# Patient Record
Sex: MALE | Race: WHITE | Employment: FULL TIME | URBAN - METROPOLITAN AREA
[De-identification: names, ages, dates, MRNs, and addresses within clinical notes are randomized per-mention and may not be internally consistent; named-entity substitution may affect disease eponyms.]

---

## 2019-10-29 ENCOUNTER — OFFICE VISIT (OUTPATIENT)
Dept: SLEEP MEDICINE | Facility: CLINIC | Age: 59
End: 2019-10-29
Payer: COMMERCIAL

## 2019-10-29 VITALS
OXYGEN SATURATION: 97 % | DIASTOLIC BLOOD PRESSURE: 86 MMHG | WEIGHT: 206 LBS | HEIGHT: 69 IN | SYSTOLIC BLOOD PRESSURE: 130 MMHG | BODY MASS INDEX: 30.51 KG/M2 | HEART RATE: 86 BPM

## 2019-10-29 DIAGNOSIS — F51.04 PSYCHOPHYSIOLOGICAL INSOMNIA: ICD-10-CM

## 2019-10-29 DIAGNOSIS — E66.09 CLASS 1 OBESITY DUE TO EXCESS CALORIES WITH SERIOUS COMORBIDITY AND BODY MASS INDEX (BMI) OF 30.0 TO 30.9 IN ADULT: ICD-10-CM

## 2019-10-29 DIAGNOSIS — E66.811 CLASS 1 OBESITY DUE TO EXCESS CALORIES WITH SERIOUS COMORBIDITY AND BODY MASS INDEX (BMI) OF 30.0 TO 30.9 IN ADULT: ICD-10-CM

## 2019-10-29 DIAGNOSIS — G47.33 OSA (OBSTRUCTIVE SLEEP APNEA): Primary | ICD-10-CM

## 2019-10-29 PROBLEM — N18.30 STAGE 3 CHRONIC KIDNEY DISEASE (H): Status: ACTIVE | Noted: 2017-11-07

## 2019-10-29 PROBLEM — I10 ESSENTIAL HYPERTENSION: Status: ACTIVE | Noted: 2019-05-03

## 2019-10-29 PROBLEM — Z79.4 CONTROLLED TYPE 2 DIABETES MELLITUS WITH STAGE 3 CHRONIC KIDNEY DISEASE, WITH LONG-TERM CURRENT USE OF INSULIN (H): Status: ACTIVE | Noted: 2018-01-22

## 2019-10-29 PROBLEM — E11.22 CONTROLLED TYPE 2 DIABETES MELLITUS WITH STAGE 3 CHRONIC KIDNEY DISEASE, WITH LONG-TERM CURRENT USE OF INSULIN (H): Status: ACTIVE | Noted: 2018-01-22

## 2019-10-29 PROBLEM — N18.30 CONTROLLED TYPE 2 DIABETES MELLITUS WITH STAGE 3 CHRONIC KIDNEY DISEASE, WITH LONG-TERM CURRENT USE OF INSULIN (H): Status: ACTIVE | Noted: 2018-01-22

## 2019-10-29 PROCEDURE — 99204 OFFICE O/P NEW MOD 45 MIN: CPT | Performed by: INTERNAL MEDICINE

## 2019-10-29 RX ORDER — ASPIRIN 81 MG/1
81 TABLET, CHEWABLE ORAL
COMMUNITY
Start: 2017-10-20

## 2019-10-29 RX ORDER — CITALOPRAM HYDROBROMIDE 20 MG/1
20 TABLET ORAL EVERY EVENING
COMMUNITY
Start: 2017-11-07

## 2019-10-29 RX ORDER — MULTIVIT-MIN/IRON/FOLIC ACID/K 18-600-40
1 CAPSULE ORAL EVERY MORNING
COMMUNITY
Start: 2019-01-29

## 2019-10-29 RX ORDER — CETIRIZINE HYDROCHLORIDE 10 MG/1
10 TABLET ORAL
COMMUNITY

## 2019-10-29 RX ORDER — INSULIN GLARGINE 100 [IU]/ML
12 INJECTION, SOLUTION SUBCUTANEOUS
COMMUNITY
End: 2019-11-22

## 2019-10-29 RX ORDER — INSULIN GLARGINE 100 [IU]/ML
14 INJECTION, SOLUTION SUBCUTANEOUS
COMMUNITY
Start: 2017-03-21

## 2019-10-29 RX ORDER — LOSARTAN POTASSIUM AND HYDROCHLOROTHIAZIDE 12.5; 1 MG/1; MG/1
1 TABLET ORAL EVERY EVENING
COMMUNITY
Start: 2019-03-07

## 2019-10-29 RX ORDER — ATORVASTATIN CALCIUM 20 MG/1
20 TABLET, FILM COATED ORAL AT BEDTIME
COMMUNITY

## 2019-10-29 RX ORDER — TRAZODONE HYDROCHLORIDE 50 MG/1
100 TABLET, FILM COATED ORAL AT BEDTIME
COMMUNITY
Start: 2019-04-22

## 2019-10-29 ASSESSMENT — MIFFLIN-ST. JEOR: SCORE: 1739.79

## 2019-10-29 NOTE — NURSING NOTE
"Chief Complaint   Patient presents with     Sleep Problem     consult- not sleeping very well       Initial /86   Pulse 86   Ht 1.753 m (5' 9\")   Wt 93.4 kg (206 lb)   SpO2 97%   BMI 30.42 kg/m   Estimated body mass index is 30.42 kg/m  as calculated from the following:    Height as of this encounter: 1.753 m (5' 9\").    Weight as of this encounter: 93.4 kg (206 lb).    Medication Reconciliation: complete    Neck circumference: 16 inches / 41 centimeters.      "

## 2019-10-29 NOTE — LETTER
10/29/2019         RE: Daniel Lorenzo  [address]  [facility] [address]        Dear Colleague,    Thank you for referring your patient, Daniel Lorenzo, to the [facility]. Please see a copy of my visit note below.      Sleep Consultation:    Date on this visit: 10/29/2019    Daniel Lorenzo is a 59 year old male presenting with complaints of trouble falling and staying asleep, waking up frequently.    He has a history of HTN, DM2, CKD, Obstructive Sleep Apnea, and obesity.  Reports he was diagnosed with Obstructive Sleep Apnea 20 years ago and couldn't tolerate CPAP with FFM at the time. Has no memory of severity or settings.      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  Getting to bed around 11 PM and takes a few hours to fall asleep.   [place] a [place] more than a year.  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  Gets home around 11:30 PM and 12:15 AM in bed after taking Trazodone.  Takes 1 - 2 hours to fall asleep.  Wakes up around 2 hours later and will check the clock, walk around, and get a drink of water.     Reports Trazodone doesn't kick in for a while so sometimes he takes it hours early but without improvement.    Sleep Disordered Breathing - Snores loudly.  Has had witnessed apneas and snort arousals.   Has nocturia 1 - 2 times per night.  Does not have nocturnal GERD.   Upon waking feels tired/lethargic.     Patient was counseled on the importance of driving while alert, to pull over if drowsy, or nap before getting into the vehicle if sleepy.    Movement/Behaviors - No somniloquy.  No somnambulism.  No sleep related eating.  No dream enactment behavior.   Patient reports rare typical restless legs syndrome symptoms or leg soreness    Alcohol use - Rare  Caffeine intake - Occ coffee in the morning.  Tobacco exposure - None    Lives with wife  (she works in [place] and only is at home on the weekends) and daughter who is just post college.  Has 1 pet, dog.     No known family history of snoring or Obstructive Sleep Apnea.    Allergies:    No Known Allergies    Medications:    Current Outpatient Medications   Medication Sig Dispense Refill     aspirin (ASA) 81 MG chewable tablet Take 81 mg by mouth       atorvastatin (LIPITOR) 20 MG tablet Take 20 mg by mouth       cetirizine (ZYRTEC ALLERGY) 10 MG tablet Take 10 mg by mouth       Cholecalciferol (VITAMIN D) 50 MCG (2000 UT) CAPS Take 1 capsule by mouth       citalopram (CELEXA) 20 MG tablet Take 20 mg by mouth       insulin glargine (BASAGLAR KWIKPEN) 100 UNIT/ML pen Inject 14 Units Subcutaneous       insulin glargine (LANTUS VIAL) 100 UNIT/ML vial Inject 12 Units Subcutaneous       losartan-hydrochlorothiazide (HYZAAR) 100-12.5 MG tablet TAKE 1 TABLET BY MOUTH DAILY       metFORMIN (GLUCOPHAGE) 1000 MG tablet TAKE 1 TABLET BY MOUTH TWICE DAILY       traZODone (DESYREL) 50 MG tablet Take  mg by mouth         Problem List:  Patient Active Problem List    Diagnosis Date Noted     Pain in joint, shoulder region 10/15/2014     Priority: Medium        Past Medical/Surgical History:  No past medical history on file.  No past surgical history on file.    Social History:  Social History     Socioeconomic History     Marital status:      Spouse name: Not on file     Number of children: Not on file     Years of education: Not on file     Highest education level: Not on file   Occupational History     Not on file   Social Needs     Financial resource strain: Not on file     Food insecurity:     Worry: Not on file     Inability: Not on file     Transportation needs:     Medical: Not on file     Non-medical: Not on file   Tobacco Use     Smoking status: Never Smoker     Smokeless tobacco: Never Used   Substance and Sexual Activity     Alcohol use: Not Currently     Drug use: Never     Sexual activity: Not  "on file   Lifestyle     Physical activity:     Days per week: Not on file     Minutes per session: Not on file     Stress: Not on file   Relationships     Social connections:     Talks on phone: Not on file     Gets together: Not on file     Attends Hindu service: Not on file     Active member of club or organization: Not on file     Attends meetings of clubs or organizations: Not on file     Relationship status: Not on file     Intimate partner violence:     Fear of current or ex partner: Not on file     Emotionally abused: Not on file     Physically abused: Not on file     Forced sexual activity: Not on file   Other Topics Concern     Not on file   Social History Narrative     Not on file     Family History:  No family history on file.    Review of Systems:  A complete review of systems reviewed by me is negative with the exeption of what has been mentioned in the history of present illness.  Headaches; weakness in arms or legs  Diarrhea; constipation; blood in stools; abdominal pain  Dysuria; hematuria; urinary frequency  Muscle pain; joint or bone pain  Increased thirst or urination; diabetes    Physical Examination:  Vitals: /86   Pulse 86   Ht 1.753 m (5' 9\")   Wt 93.4 kg (206 lb)   SpO2 97%   BMI 30.42 kg/m     BMI= Body mass index is 30.42 kg/m .    Neck Cir (cm): 41 cm    [place] Total Score 10/29/2019   Total score - [place] 12     GERI Total Score: 22 (10/29/19 1100)    General appearance: No apparent distress, well groomed.    HEENT:   Head: Normocephalic, atraumatic.  Eyes: PERRL  Nose: Nares patent.  No exudate.  No septal deviation noted.  Mouth: Teeth: Fair to good, multiple caries.   Tongue: Normal  Oropharynx:  Mallampati Classification: II    Tonsils: Grade 0    Uvula: Normal    Neck: Supple without bruit.     Neck Cir (cm): 41 cm (10/29/2019 11:22 AM)      Cardiac: Regular rate and rhythm.  No murmurs.  Radial pulses are strong and symmetric.  Pulmonary: Symmetric air movement, " lungs clear to auscultation bilaterally.  Musculoskeletal: No edema noted.  No clubbing or cyanosis.  Skin: Warm, dry, intact.  Neurologic: Alert and oriented to person, place and time   Gait is normal.  Psychiatric: Affect pleasant.  Mood good.     Impression/Plan:  1. ELKIN (obstructive sleep apnea)  Patient carries diagnosis of Obstructive Sleep Apnea but we have no idea when or where he was diagnosed, nor do we have anything about magnitude of severity.  Given severe insomnia and sleep fragmentation with daytime symptoms, I am in favor of re-evaluating sleep apnea with a split night sleep study.      We discussed pathophysiology of obstructive sleep apnea, testing with overnight polysomnography, and treatment modalities (CPAP only discussed at this visit). We discussed consequences of untreated Obstructive Sleep Apnea, such as markedly elevated risk for heart attack or stroke. Patient is interested in treatment and willing to undergo overnight sleep testing.    Home sleep testing is inappropriate for this patient due to severe and persistent insomnia.  Study has been ordered today.      In case of insomnia during the study: patient has been instructed to bring home medications.  - Comprehensive Sleep Study; Future    2. Class 1 obesity due to excess calories with serious comorbidity and body mass index (BMI) of 30.0 to 30.9 in adult  We discussed the link between obesity, sleep apnea, and health outcomes.  Patient was encouraged to decrease caloric intake and increase activity levels to try to move towards a normal weight.  He was encouraged to discuss further strategies with his primary care provider.     3. Psychophysiological insomnia  Severe insomnia in the context of Obstructive Sleep Apnea noted above. Will start with Obstructive Sleep Apnea re-evaluation and management first.    Literature provided regarding sleep apnea.      He will follow up with me in approximately two weeks after his sleep study has  been competed to review the results and discuss plan of care.       Polysomnography reviewed.  Obstructive sleep apnea reviewed.  Complications of untreated sleep apnea were reviewed.    Eduard Mcclendon MD, MD, Sleep Physician  Oct 29, 2019     CC: No ref. provider found          Again, thank you for allowing me to participate in the care of your patient.        Sincerely,        Eduard Mcclendon MD

## 2019-10-29 NOTE — PROGRESS NOTES
Sleep Consultation:    Date on this visit: 10/29/2019    Daniel Lorenzo is a 59 year old male presenting with complaints of trouble falling and staying asleep, waking up frequently.    He has a history of HTN, DM2, CKD, Obstructive Sleep Apnea, and obesity.  Reports he was diagnosed with Obstructive Sleep Apnea 20 years ago and couldn't tolerate CPAP with FFM at the time. Has no memory of severity or settings.      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  Getting to bed around 11 PM and takes a few hours to fall asleep.   [place] a [place] more than a year.  Works Sa 12:30 PM - 11 PM, Bhardwaj 2:30 PM - 11 PM, M 2:30 PM - 11 PM at [employer].  Gets home around 11:30 PM and 12:15 AM in bed after taking Trazodone.  Takes 1 - 2 hours to fall asleep.  Wakes up around 2 hours later and will check the clock, walk around, and get a drink of water.     Reports Trazodone doesn't kick in for a while so sometimes he takes it hours early but without improvement.    Sleep Disordered Breathing - Snores loudly.  Has had witnessed apneas and snort arousals.   Has nocturia 1 - 2 times per night.  Does not have nocturnal GERD.   Upon waking feels tired/lethargic.     Patient was counseled on the importance of driving while alert, to pull over if drowsy, or nap before getting into the vehicle if sleepy.    Movement/Behaviors - No somniloquy.  No somnambulism.  No sleep related eating.  No dream enactment behavior.   Patient reports rare typical restless legs syndrome symptoms or leg soreness    Alcohol use - Rare  Caffeine intake - Occ coffee in the morning.  Tobacco exposure - None    Lives with wife (she works in [place] and only is at home on the weekends) and daughter who is just post college.  Has 1 pet, dog.     No known family history of snoring or Obstructive Sleep Apnea.    Allergies:    No Known Allergies    Medications:    Current  Outpatient Medications   Medication Sig Dispense Refill     aspirin (ASA) 81 MG chewable tablet Take 81 mg by mouth       atorvastatin (LIPITOR) 20 MG tablet Take 20 mg by mouth       cetirizine (ZYRTEC ALLERGY) 10 MG tablet Take 10 mg by mouth       Cholecalciferol (VITAMIN D) 50 MCG (2000 UT) CAPS Take 1 capsule by mouth       citalopram (CELEXA) 20 MG tablet Take 20 mg by mouth       insulin glargine (BASAGLAR KWIKPEN) 100 UNIT/ML pen Inject 14 Units Subcutaneous       insulin glargine (LANTUS VIAL) 100 UNIT/ML vial Inject 12 Units Subcutaneous       losartan-hydrochlorothiazide (HYZAAR) 100-12.5 MG tablet TAKE 1 TABLET BY MOUTH DAILY       metFORMIN (GLUCOPHAGE) 1000 MG tablet TAKE 1 TABLET BY MOUTH TWICE DAILY       traZODone (DESYREL) 50 MG tablet Take  mg by mouth         Problem List:  Patient Active Problem List    Diagnosis Date Noted     Pain in joint, shoulder region 10/15/2014     Priority: Medium        Past Medical/Surgical History:  No past medical history on file.  No past surgical history on file.    Social History:  Social History     Socioeconomic History     Marital status:      Spouse name: Not on file     Number of children: Not on file     Years of education: Not on file     Highest education level: Not on file   Occupational History     Not on file   Social Needs     Financial resource strain: Not on file     Food insecurity:     Worry: Not on file     Inability: Not on file     Transportation needs:     Medical: Not on file     Non-medical: Not on file   Tobacco Use     Smoking status: Never Smoker     Smokeless tobacco: Never Used   Substance and Sexual Activity     Alcohol use: Not Currently     Drug use: Never     Sexual activity: Not on file   Lifestyle     Physical activity:     Days per week: Not on file     Minutes per session: Not on file     Stress: Not on file   Relationships     Social connections:     Talks on phone: Not on file     Gets together: Not on file      "Attends Adventist service: Not on file     Active member of club or organization: Not on file     Attends meetings of clubs or organizations: Not on file     Relationship status: Not on file     Intimate partner violence:     Fear of current or ex partner: Not on file     Emotionally abused: Not on file     Physically abused: Not on file     Forced sexual activity: Not on file   Other Topics Concern     Not on file   Social History Narrative     Not on file     Family History:  No family history on file.    Review of Systems:  A complete review of systems reviewed by me is negative with the exeption of what has been mentioned in the history of present illness.  Headaches; weakness in arms or legs  Diarrhea; constipation; blood in stools; abdominal pain  Dysuria; hematuria; urinary frequency  Muscle pain; joint or bone pain  Increased thirst or urination; diabetes    Physical Examination:  Vitals: /86   Pulse 86   Ht 1.753 m (5' 9\")   Wt 93.4 kg (206 lb)   SpO2 97%   BMI 30.42 kg/m    BMI= Body mass index is 30.42 kg/m .    Neck Cir (cm): 41 cm    [place] Total Score 10/29/2019   Total score - [place] 12     GERI Total Score: 22 (10/29/19 1100)    General appearance: No apparent distress, well groomed.    HEENT:   Head: Normocephalic, atraumatic.  Eyes: PERRL  Nose: Nares patent.  No exudate.  No septal deviation noted.  Mouth: Teeth: Fair to good, multiple caries.   Tongue: Normal  Oropharynx:  Mallampati Classification: II    Tonsils: Grade 0    Uvula: Normal    Neck: Supple without bruit.     Neck Cir (cm): 41 cm (10/29/2019 11:22 AM)      Cardiac: Regular rate and rhythm.  No murmurs.  Radial pulses are strong and symmetric.  Pulmonary: Symmetric air movement, lungs clear to auscultation bilaterally.  Musculoskeletal: No edema noted.  No clubbing or cyanosis.  Skin: Warm, dry, intact.  Neurologic: Alert and oriented to person, place and time   Gait is normal.  Psychiatric: Affect pleasant.  Mood good. "     Impression/Plan:  1. ELKIN (obstructive sleep apnea)  Patient carries diagnosis of Obstructive Sleep Apnea but we have no idea when or where he was diagnosed, nor do we have anything about magnitude of severity.  Given severe insomnia and sleep fragmentation with daytime symptoms, I am in favor of re-evaluating sleep apnea with a split night sleep study.      We discussed pathophysiology of obstructive sleep apnea, testing with overnight polysomnography, and treatment modalities (CPAP only discussed at this visit). We discussed consequences of untreated Obstructive Sleep Apnea, such as markedly elevated risk for heart attack or stroke. Patient is interested in treatment and willing to undergo overnight sleep testing.    Home sleep testing is inappropriate for this patient due to severe and persistent insomnia.  Study has been ordered today.      In case of insomnia during the study: patient has been instructed to bring home medications.  - Comprehensive Sleep Study; Future    2. Class 1 obesity due to excess calories with serious comorbidity and body mass index (BMI) of 30.0 to 30.9 in adult  We discussed the link between obesity, sleep apnea, and health outcomes.  Patient was encouraged to decrease caloric intake and increase activity levels to try to move towards a normal weight.  He was encouraged to discuss further strategies with his primary care provider.     3. Psychophysiological insomnia  Severe insomnia in the context of Obstructive Sleep Apnea noted above. Will start with Obstructive Sleep Apnea re-evaluation and management first.    Literature provided regarding sleep apnea.      He will follow up with me in approximately two weeks after his sleep study has been competed to review the results and discuss plan of care.       Polysomnography reviewed.  Obstructive sleep apnea reviewed.  Complications of untreated sleep apnea were reviewed.    Eduard Mcclendon MD, MD, Sleep Physician  Oct 29, 2019      CC: No ref. provider found

## 2019-10-29 NOTE — PATIENT INSTRUCTIONS
Your BMI is Body mass index is 30.42 kg/m .  Weight management is a personal decision.  If you are interested in exploring weight loss strategies, the following discussion covers the approaches that may be successful. Body mass index (BMI) is one way to tell whether you are at a healthy weight, overweight, or obese. It measures your weight in relation to your height.  A BMI of 18.5 to 24.9 is in the healthy range. A person with a BMI of 25 to 29.9 is considered overweight, and someone with a BMI of 30 or greater is considered obese. More than two-thirds of American adults are considered overweight or obese.  Being overweight or obese increases the risk for further weight gain. Excess weight may lead to heart disease and diabetes.  Creating and following plans for healthy eating and physical activity may help you improve your health.  Weight control is part of healthy lifestyle and includes exercise, emotional health, and healthy eating habits. Careful eating habits lifelong are the mainstay of weight control. Though there are significant health benefits from weight loss, long-term weight loss with diet alone may be very difficult to achieve- studies show long-term success with dietary management in less than 10% of people. Attaining a healthy weight may be especially difficult to achieve in those with severe obesity. In some cases, medications, devices and surgical management might be considered.  What can you do?  If you are overweight or obese and are interested in methods for weight loss, you should discuss this with your provider.     Consider reducing daily calorie intake by 500 calories.     Keep a food journal.     Avoiding skipping meals, consider cutting portions instead.    Diet combined with exercise helps maintain muscle while optimizing fat loss. Strength training is particularly important for building and maintaining muscle mass. Exercise helps reduce stress, increase energy, and improves fitness.  Increasing exercise without diet control, however, may not burn enough calories to loose weight.       Start walking three days a week 10-20 minutes at a time    Work towards walking thirty minutes five days a week     Eventually, increase the speed of your walking for 1-2 minutes at time    In addition, we recommend that you review healthy lifestyles and methods for weight loss available through the National Institutes of Health patient information sites:  http://win.niddk.nih.gov/publications/index.htm    And look into health and wellness programs that may be available through your health insurance provider, employer, local community center, or jason club.    Weight management plan: Patient was referred to their PCP to discuss a diet and exercise plan.

## 2019-11-06 ENCOUNTER — TRANSFERRED RECORDS (OUTPATIENT)
Dept: HEALTH INFORMATION MANAGEMENT | Facility: CLINIC | Age: 59
End: 2019-11-06

## 2019-11-12 ENCOUNTER — TELEPHONE (OUTPATIENT)
Dept: UROLOGY | Facility: CLINIC | Age: 59
End: 2019-11-12

## 2019-11-12 NOTE — TELEPHONE ENCOUNTER
Lina Springer 11/12 4:49PM    [facility] recs in [facility]   Images in [facility] PACS -11/15     Fax To [facility] to push images and fed ex path slides

## 2019-11-12 NOTE — TELEPHONE ENCOUNTER
ONCOLOGY INTAKE: Records Information      APPT INFORMATION: [id number] Dr. Roland Caldwell  Referring provider:  Dr. Du Redd  Referring provider s clinic:  [facility]  Reason for visit/diagnosis:  Urethral Carcinoma  Has patient been notified of appointment date and time?: Yes    RECORDS INFORMATION:  Were the records received with the referral (via [employer])? No    Has patient been seen for any external appt for this diagnosis? Yes    If yes, where? [facility]    Has patient had any imaging or procedures outside of [facility] for this condition? Yes      If Yes, where? Psychiatric hospital    ADDITIONAL INFORMATION:  None

## 2019-11-13 ENCOUNTER — DOCUMENTATION ONLY (OUTPATIENT)
Dept: CARE COORDINATION | Facility: CLINIC | Age: 59
End: 2019-11-13

## 2019-11-18 ENCOUNTER — OFFICE VISIT (OUTPATIENT)
Dept: UROLOGY | Facility: CLINIC | Age: 59
End: 2019-11-18
Payer: COMMERCIAL

## 2019-11-18 ENCOUNTER — ALLIED HEALTH/NURSE VISIT (OUTPATIENT)
Dept: UROLOGY | Facility: CLINIC | Age: 59
End: 2019-11-18
Payer: COMMERCIAL

## 2019-11-18 VITALS
HEIGHT: 69 IN | SYSTOLIC BLOOD PRESSURE: 127 MMHG | DIASTOLIC BLOOD PRESSURE: 87 MMHG | BODY MASS INDEX: 30.36 KG/M2 | WEIGHT: 205 LBS | HEART RATE: 92 BPM

## 2019-11-18 DIAGNOSIS — C68.0 URETHRAL CANCER (H): Primary | ICD-10-CM

## 2019-11-18 RX ORDER — HYDROCODONE BITARTRATE AND ACETAMINOPHEN 5; 300 MG/1; MG/1
1 TABLET ORAL EVERY 6 HOURS PRN
Qty: 30 TABLET | Refills: 0 | Status: SHIPPED | OUTPATIENT
Start: 2019-11-18

## 2019-11-18 RX ORDER — ALVIMOPAN 12 MG/1
12 CAPSULE ORAL ONCE
Status: CANCELLED | OUTPATIENT
Start: 2019-11-18 | End: 2019-11-18

## 2019-11-18 RX ORDER — HEPARIN SODIUM 5000 [USP'U]/.5ML
5000 INJECTION, SOLUTION INTRAVENOUS; SUBCUTANEOUS
Status: CANCELLED | OUTPATIENT
Start: 2019-11-18

## 2019-11-18 ASSESSMENT — ENCOUNTER SYMPTOMS
WEIGHT LOSS: 0
HALLUCINATIONS: 0
POLYDIPSIA: 0
NIGHT SWEATS: 0
FEVER: 0
INCREASED ENERGY: 0
DYSURIA: 0
ALTERED TEMPERATURE REGULATION: 0
DECREASED APPETITE: 0
POLYPHAGIA: 0
CHILLS: 0
HEMATURIA: 1
WEIGHT GAIN: 0
FATIGUE: 0

## 2019-11-18 ASSESSMENT — PAIN SCALES - GENERAL: PAINLEVEL: SEVERE PAIN (6)

## 2019-11-18 ASSESSMENT — MIFFLIN-ST. JEOR: SCORE: 1735.25

## 2019-11-18 NOTE — PROGRESS NOTES
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 Daniel Lorenzo is a 59 year old male seen in consultation from Dr. Redd.         History of Present Illness:    Daniel Lorenzo is a very pleasant 59 year old male who presents with a history of urethral cancer.  The urethral cancer is high grade squamous cell cancer and invades the spongiosum and the corpus cavernosum.  The size of the mass is 3.7 x 4.1 X 7.8 and though not definitively proven to invade, it is close to the striated sphincter.    He has a scott in place because his referring physician believed that complete urinary retention is a possibility given his recent cystoscopic findings.    He has no definitive sign of mets, there are visible, but not enlarged lymph nodes in both inguinal canals.      He notes three relatives dying from cancer including father and two sisters, lung, colon and pancreatic irrespectively.    No abd surgery apart from left inguinal hernia surgery.    Type 2 diabetes.           Past Medical History:   No past medical history on file.   Type 2 diabetes.  Diabetes  Urethral Cancer  Hyperlipidemia  Allergies  hypertension  Depression/anxiety         Past Surgical History:   No past surgical history on file.  Left inguinal hernia repair           Medications     Current Outpatient Medications   Medication     atorvastatin (LIPITOR) 20 MG tablet     cetirizine (ZYRTEC ALLERGY) 10 MG tablet     Cholecalciferol (VITAMIN D) 50 MCG (2000 UT) CAPS     citalopram (CELEXA) 20 MG tablet     HYDROcodone-Acetaminophen (VICODIN) 5-300 MG TABS     insulin glargine (BASAGLAR KWIKPEN) 100 UNIT/ML pen     insulin glargine (LANTUS VIAL) 100 UNIT/ML vial     losartan-hydrochlorothiazide (HYZAAR) 100-12.5 MG tablet     metFORMIN (GLUCOPHAGE) 1000 MG tablet     traZODone (DESYREL) 50 MG tablet     aspirin (ASA) 81 MG chewable tablet     No current facility-administered  "medications for this visit.             Family History:   No family history on file.     Cancer, see above.         Social History:     Social History     Socioeconomic History     Marital status:      Spouse name: Not on file     Number of children: Not on file     Years of education: Not on file     Highest education level: Not on file   Occupational History     Not on file   Social Needs     Financial resource strain: Not on file     Food insecurity:     Worry: Not on file     Inability: Not on file     Transportation needs:     Medical: Not on file     Non-medical: Not on file   Tobacco Use     Smoking status: Never Smoker     Smokeless tobacco: Never Used   Substance and Sexual Activity     Alcohol use: Not Currently     Drug use: Never     Sexual activity: Not on file   Lifestyle     Physical activity:     Days per week: Not on file     Minutes per session: Not on file     Stress: Not on file   Relationships     Social connections:     Talks on phone: Not on file     Gets together: Not on file     Attends Alevism service: Not on file     Active member of club or organization: Not on file     Attends meetings of clubs or organizations: Not on file     Relationship status: Not on file     Intimate partner violence:     Fear of current or ex partner: Not on file     Emotionally abused: Not on file     Physically abused: Not on file     Forced sexual activity: Not on file   Other Topics Concern     Not on file   Social History Narrative     Not on file            Allergies:   Patient has no known allergies.         Review of Systems:  From intake questionnaire     Negative 14 system review except as noted on HPI, nurse's note.         Physical Exam:     Patient is a 59 year old  male   Vitals: Blood pressure 127/87, pulse 92, height 1.753 m (5' 9\"), weight 93 kg (205 lb).  General Appearance Adult: Alert, no acute distress, oriented  HENT: throat/mouth:normal, good dentition  Neck: No adenopathy,masses or " thyromegaly  Lungs: no respiratory distress, or pursed lip breathing  Heart: No obvious jugular venous distension present  Abdomen: soft, nontender, no organomegaly or masses, Body mass index is 30.27 kg/m ., scars noted left inguinal incision  Lymphatics: No cervical or supraclavicular adenopathy  Musculoskeltal: extremities normal, no peripheral edema  Skin: no suspicious lesions or rashes  Neuro: Alert, oriented, speech and mentation normal  Psych: affect and mood normal  Gait: Normal  : scott inplace, no skin breakdown in the perineum, mass can be palpated deeply after looking at the images      Labs and Pathology:    I reviewed all applicable laboratory and pathology data and went over findings with patient  Significant for No results found for: CR    Invasive urethral cancer diagnosed 11/2019        Imaging:    I reviewed all applicable imaging and went over findings with patient.  Significant for MRI pelvis shows large mass    CT shows no definte mets, but several sub-cm nodules noted      Outside and Past Medical records:    I spent 10 minutes reviewing outside and past medical records.             Assessment and Plan:     Assessment:  cT3N0?M0 Urethral cancer (Sqamous cell)    Metastatic workup is completed.  If completed it does not show sign of metastatic disease.    Plan:    We had a long discussion about invasive urethral cancer.  We discussed the fact that once urethral cancer invades the penis it can potentially invade the blood vessels and lymphatic channels and spread throughout the body.  Once urethral cancer spreads to distant organs, the opportunity for cure is rare, so we have the best shot for cure with aggressive surgical intervention.    Given the size nearly 8 cms, involving the corpus spongiosum and corpus cavernosum, a penectomy and urethrectomy is mandated, the mass goes quite close to the urethral spincter and I think the cancer surgery would be compromised by trying to leave this in  tact.  I would recommend cystoprostatectomy, penectomy and urethrectomy to give maximal chance at a cure.    We discussed radical penectomy, urethrectomy and cystopreostatectomy and the potential complications associated with it, including a 2-3% perioperative mortality risk, and the risk of complications is about 80%.  We also discussed about 20-30% of patients need to go to a rehab facility after surgery and about 25% return to the hospital for complications.      We discussed the use of neoadjuvant chemotherapy in the setting of invasive urethral cancer (squamous type) is not typically effective and surgical treatment is often the best approach.    The patient and his family had many questions and we went over these carefully.  We provided the patient with a bladder cancer binder and indicated the resources available inside.    The patient has decided to proceed with a radical penectomy, urethrectomy and cystectomy and  lymph node dissection.  he would like to proceed with an ileal conduit urinary diversion.  Will get this scheduled in the near future.            Orders  Orders Placed This Encounter   Procedures     MR [facility] Overread     US Lower Extremity Venous Duplex Bilateral     PAC Visit Referral (For [facility] Only)     [facility] Nursing Referral       F/U:For pac and Surgery    I spent over 60 minutes with patient with more than 50% face to face discussion and coordination of care.      Roland Caldwell MD  Department of Urology Staff  [facility]    Note, dictation software may have been used for this note and the content was not proofread for accuracy    CC  Patient Care Team:  No Ref-Primary, Physician as PCP - General  Cinthia Hannah, RN as Registered Nurse (Oncology)  Roland Caldwell MD as MD (Urology)  PROVIDER NOT IN SYSTEM    Copy to patient  ZOË WILLIS  [address]  [facility] [id number]      Answers for HPI/ROS submitted by the patient on 11/18/2019   General  Symptoms: Yes  Skin Symptoms: No  HENT Symptoms: No  EYE SYMPTOMS: No  HEART SYMPTOMS: No  LUNG SYMPTOMS: No  INTESTINAL SYMPTOMS: No  URINARY SYMPTOMS: Yes  REPRODUCTIVE SYMPTOMS: No  SKELETAL SYMPTOMS: No  BLOOD SYMPTOMS: No  NERVOUS SYSTEM SYMPTOMS: No  MENTAL HEALTH SYMPTOMS: No  Fever: No  Loss of appetite: No  Weight loss: No  Weight gain: No  Fatigue: No  Night sweats: No  Chills: No  Increased stress: No  Excessive hunger: No  Excessive thirst: No  Feeling hot or cold when others believe the temperature is normal: No  Loss of height: No  Post-operative complications: No  Surgical site pain: No  Hallucinations: No  Change in or Loss of Energy: No  Hyperactivity: No  Confusion: No  Trouble holding urine or incontinence: No  Pain or burning: No  Trouble starting or stopping: No  Increased frequency of urination: No  Blood in urine: Yes  Decreased frequency of urination: No  Frequent nighttime urination: No

## 2019-11-18 NOTE — LETTER
11/18/2019       RE: Daniel Lorenzo  [address]  [facility] [address]     Dear Colleague,    Thank you for referring your patient, Daniel Lorenzo, to the [facility] UROLOGY AND INST FOR PROSTATE AND UROLOGIC CANCERS at [facility]. Please see a copy of my visit note below.          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 Daniel Lorenzo is a 59 year old male seen in consultation from Dr. Redd.         History of Present Illness:    Daniel Lorenzo is a very pleasant 59 year old male who presents with a history of urethral cancer.  The urethral cancer is high grade squamous cell cancer and invades the spongiosum and the corpus cavernosum.  The size of the mass is 3.7 x 4.1 X 7.8 and though not definitively proven to invade, it is close to the striated sphincter.    He has a scott in place because his referring physician believed that complete urinary retention is a possibility given his recent cystoscopic findings.    He has no definitive sign of mets, there are visible, but not enlarged lymph nodes in both inguinal canals.      He notes three relatives dying from cancer including father and two sisters, lung, colon and pancreatic irrespectively.    No abd surgery apart from left inguinal hernia surgery.    Type 2 diabetes.           Past Medical History:   No past medical history on file.   Type 2 diabetes.  Diabetes  Urethral Cancer  Hyperlipidemia  Allergies  hypertension  Depression/anxiety         Past Surgical History:   No past surgical history on file.  Left inguinal hernia repair           Medications     Current Outpatient Medications   Medication     atorvastatin (LIPITOR) 20 MG tablet     cetirizine (ZYRTEC ALLERGY) 10 MG tablet     Cholecalciferol (VITAMIN D) 50 MCG (2000 UT) CAPS     citalopram (CELEXA) 20 MG tablet     HYDROcodone-Acetaminophen (VICODIN) 5-300 MG TABS      insulin glargine (BASAGLAR KWIKPEN) 100 UNIT/ML pen     insulin glargine (LANTUS VIAL) 100 UNIT/ML vial     losartan-hydrochlorothiazide (HYZAAR) 100-12.5 MG tablet     metFORMIN (GLUCOPHAGE) 1000 MG tablet     traZODone (DESYREL) 50 MG tablet     aspirin (ASA) 81 MG chewable tablet     No current facility-administered medications for this visit.             Family History:   No family history on file.     Cancer, see above.         Social History:     Social History     Socioeconomic History     Marital status:      Spouse name: Not on file     Number of children: Not on file     Years of education: Not on file     Highest education level: Not on file   Occupational History     Not on file   Social Needs     Financial resource strain: Not on file     Food insecurity:     Worry: Not on file     Inability: Not on file     Transportation needs:     Medical: Not on file     Non-medical: Not on file   Tobacco Use     Smoking status: Never Smoker     Smokeless tobacco: Never Used   Substance and Sexual Activity     Alcohol use: Not Currently     Drug use: Never     Sexual activity: Not on file   Lifestyle     Physical activity:     Days per week: Not on file     Minutes per session: Not on file     Stress: Not on file   Relationships     Social connections:     Talks on phone: Not on file     Gets together: Not on file     Attends Pentecostal service: Not on file     Active member of club or organization: Not on file     Attends meetings of clubs or organizations: Not on file     Relationship status: Not on file     Intimate partner violence:     Fear of current or ex partner: Not on file     Emotionally abused: Not on file     Physically abused: Not on file     Forced sexual activity: Not on file   Other Topics Concern     Not on file   Social History Narrative     Not on file            Allergies:   Patient has no known allergies.         Review of Systems:  From intake questionnaire     Negative 14 system  "review except as noted on HPI, nurse's note.         Physical Exam:     Patient is a 59 year old  male   Vitals: Blood pressure 127/87, pulse 92, height 1.753 m (5' 9\"), weight 93 kg (205 lb).  General Appearance Adult: Alert, no acute distress, oriented  HENT: throat/mouth:normal, good dentition  Neck: No adenopathy,masses or thyromegaly  Lungs: no respiratory distress, or pursed lip breathing  Heart: No obvious jugular venous distension present  Abdomen: soft, nontender, no organomegaly or masses, Body mass index is 30.27 kg/m ., scars noted left inguinal incision  Lymphatics: No cervical or supraclavicular adenopathy  Musculoskeltal: extremities normal, no peripheral edema  Skin: no suspicious lesions or rashes  Neuro: Alert, oriented, speech and mentation normal  Psych: affect and mood normal  Gait: Normal  : scott inplace, no skin breakdown in the perineum, mass can be palpated deeply after looking at the images      Labs and Pathology:    I reviewed all applicable laboratory and pathology data and went over findings with patient  Significant for No results found for: CR    Invasive urethral cancer diagnosed 11/2019        Imaging:    I reviewed all applicable imaging and went over findings with patient.  Significant for MRI pelvis shows large mass    CT shows no definte mets, but several sub-cm nodules noted      Outside and Past Medical records:    I spent 10 minutes reviewing outside and past medical records.             Assessment and Plan:     Assessment:  cT3N0?M0 Urethral cancer (Sqamous cell)    Metastatic workup is completed.  If completed it does not show sign of metastatic disease.    Plan:    We had a long discussion about invasive urethral cancer.  We discussed the fact that once urethral cancer invades the penis it can potentially invade the blood vessels and lymphatic channels and spread throughout the body.  Once urethral cancer spreads to distant organs, the opportunity for cure is rare, so " we have the best shot for cure with aggressive surgical intervention.    Given the size nearly 8 cms, involving the corpus spongiosum and corpus cavernosum, a penectomy and urethrectomy is mandated, the mass goes quite close to the urethral spincter and I think the cancer surgery would be compromised by trying to leave this in tact.  I would recommend cystoprostatectomy, penectomy and urethrectomy to give maximal chance at a cure.    We discussed radical penectomy, urethrectomy and cystopreostatectomy and the potential complications associated with it, including a 2-3% perioperative mortality risk, and the risk of complications is about 80%.  We also discussed about 20-30% of patients need to go to a rehab facility after surgery and about 25% return to the hospital for complications.      We discussed the use of neoadjuvant chemotherapy in the setting of invasive urethral cancer (squamous type) is not typically effective and surgical treatment is often the best approach.    The patient and his family had many questions and we went over these carefully.  We provided the patient with a bladder cancer binder and indicated the resources available inside.    The patient has decided to proceed with a radical penectomy, urethrectomy and cystectomy and  lymph node dissection.  he would like to proceed with an ileal conduit urinary diversion.  Will get this scheduled in the near future.            Orders  Orders Placed This Encounter   Procedures     MR [facility] Overread     US Lower Extremity Venous Duplex Bilateral     PAC Visit Referral (For [facility] Only)     [facility] Referral       F/U:For pac and Surgery    I spent over 60 minutes with patient with more than 50% face to face discussion and coordination of care.      Roland Caldwell MD  Department of Urology Staff  [facility]    Note, dictation software may have been used for this note and the content was not proofread for accuracy    CC  Patient Care  Team:  No Ref-Primary, Physician as PCP - General  Cinthia Hannah, RN as Registered Nurse (Oncology)  Paulette, Roland Palencia MD as MD (Urology)  PROVIDER NOT IN SYSTEM    Copy to patient  ZOË WILLIS  [address]  [facility] [id number]      Answers for HPI/ROS submitted by the patient on 11/18/2019   General Symptoms: Yes  Skin Symptoms: No  HENT Symptoms: No  EYE SYMPTOMS: No  HEART SYMPTOMS: No  LUNG SYMPTOMS: No  INTESTINAL SYMPTOMS: No  URINARY SYMPTOMS: Yes  REPRODUCTIVE SYMPTOMS: No  SKELETAL SYMPTOMS: No  BLOOD SYMPTOMS: No  NERVOUS SYSTEM SYMPTOMS: No  MENTAL HEALTH SYMPTOMS: No  Fever: No  Loss of appetite: No  Weight loss: No  Weight gain: No  Fatigue: No  Night sweats: No  Chills: No  Increased stress: No  Excessive hunger: No  Excessive thirst: No  Feeling hot or cold when others believe the temperature is normal: No  Loss of height: No  Post-operative complications: No  Surgical site pain: No  Hallucinations: No  Change in or Loss of Energy: No  Hyperactivity: No  Confusion: No  Trouble holding urine or incontinence: No  Pain or burning: No  Trouble starting or stopping: No  Increased frequency of urination: No  Blood in urine: Yes  Decreased frequency of urination: No  Frequent nighttime urination: No      Again, thank you for allowing me to participate in the care of your patient.      Sincerely,    Roland Caldwell MD

## 2019-11-19 NOTE — PROGRESS NOTES
Pre Op Teaching Flowsheet       Pre and Post op Patient Education  Relevant Diagnosis:  Bladder cancer  Surgical procedure:  Penectomy, Urethrectomy, Radical Cystectomy, Pelvic Lymph node dissection and ileal conduit urinary diversion  Teaching Topic:  Pre and post op teaching  Person Involved in teaching: Daniel Lorenzo, wife and daughter    Motivation Level:  Asks Questions: Yes  Eager to Learn:  Yes  Cooperative: Yes  Receptive (willing/able to accept information):  Yes    Patient demonstrates understanding of the following:  Date of surgery:  TBD  Location of surgery:  [address] [place] [address]  History and Physical and any other testing necessary prior to surgery: Yes  Required time line for completion of History and Physical and any pre-op testing: Yes    Patient demonstrates understanding of the following:  Pre-op bowel prep:  Clear liquids day prior  Pre-op showering/scrub information with PCMX Soap: Yes  Blood thinner medications discussed and when to stop (if applicable):  Yes      Infection Prevention:   Patient demonstrates understanding of the following:  Surgical procedure site care taught: at time of discharge  Signs and symptoms of infection taught:  Yes      Post-op follow-up:  Discussed how to contact the hospital, nurse, and clinic scheduling staff if necessary.    Instructional materials used/given/mailed:  [facility] Booklet, post op teaching sheet, Map, Soap, and arrival/location information.    Surgical instructions packet given to patient in office:  Yes.    Patient has a  and someone to stay with him for the first 24 hours.

## 2019-11-20 ENCOUNTER — TELEPHONE (OUTPATIENT)
Dept: UROLOGY | Facility: CLINIC | Age: 59
End: 2019-11-20

## 2019-11-20 ENCOUNTER — HOSPITAL ENCOUNTER (INPATIENT)
Facility: CLINIC | Age: 59
Setting detail: SURGERY ADMIT
End: 2019-11-20
Attending: UROLOGY | Admitting: UROLOGY
Payer: COMMERCIAL

## 2019-11-20 DIAGNOSIS — C68.0 URETHRAL CANCER (H): ICD-10-CM

## 2019-11-20 DIAGNOSIS — Z01.818 PRE-OP EXAM: Primary | ICD-10-CM

## 2019-11-20 NOTE — TELEPHONE ENCOUNTER
[facility] Call Center    Phone Message    May a detailed message be left on voicemail: yes    Reason for Call: Other: pt states they were expecting a call from dr. frankel on either mon/tues but did not recieve a call to set up surgery. please call pt back.     Action Taken: Message routed to:  [facility] ([facility] jet calzada

## 2019-11-21 PROBLEM — N36.8 MASS OF URETHRA: Status: ACTIVE | Noted: 2019-11-04

## 2019-11-21 PROBLEM — R70.1 RETICULOCYTOSIS: Status: ACTIVE | Noted: 2018-01-22

## 2019-11-21 PROBLEM — R31.9 HEMATURIA: Status: ACTIVE | Noted: 2019-11-04

## 2019-11-21 PROBLEM — D64.9 ABSOLUTE ANEMIA: Status: ACTIVE | Noted: 2018-01-25

## 2019-11-21 NOTE — TELEPHONE ENCOUNTER
FUTURE VISIT INFORMATION      SURGERY INFORMATION:    19, Roland Weight, [facility] OR, General    RECORDS REQUESTED FROM:       Primary Care Provider: Hakan Barros- [facility]    Pertinent Medical History: ELKIN, hypertension    Most recent EKG+ Tracin19- [facility]- requested tracing

## 2019-11-22 ENCOUNTER — ANCILLARY PROCEDURE (OUTPATIENT)
Dept: ULTRASOUND IMAGING | Facility: CLINIC | Age: 59
End: 2019-11-22
Attending: UROLOGY
Payer: COMMERCIAL

## 2019-11-22 ENCOUNTER — OFFICE VISIT (OUTPATIENT)
Dept: SURGERY | Facility: CLINIC | Age: 59
End: 2019-11-22
Payer: COMMERCIAL

## 2019-11-22 ENCOUNTER — ANESTHESIA EVENT (OUTPATIENT)
Dept: SURGERY | Facility: CLINIC | Age: 59
End: 2019-11-22

## 2019-11-22 ENCOUNTER — OFFICE VISIT (OUTPATIENT)
Dept: WOUND CARE | Facility: CLINIC | Age: 59
End: 2019-11-22
Payer: COMMERCIAL

## 2019-11-22 ENCOUNTER — PRE VISIT (OUTPATIENT)
Dept: SURGERY | Facility: CLINIC | Age: 59
End: 2019-11-22

## 2019-11-22 VITALS
HEART RATE: 88 BPM | HEIGHT: 69 IN | SYSTOLIC BLOOD PRESSURE: 131 MMHG | TEMPERATURE: 97.5 F | DIASTOLIC BLOOD PRESSURE: 82 MMHG | BODY MASS INDEX: 30.66 KG/M2 | RESPIRATION RATE: 16 BRPM | OXYGEN SATURATION: 98 % | WEIGHT: 207 LBS

## 2019-11-22 DIAGNOSIS — Z01.818 PREOP EXAMINATION: Primary | ICD-10-CM

## 2019-11-22 DIAGNOSIS — Z71.89 OSTOMY NURSE CONSULTATION: Primary | ICD-10-CM

## 2019-11-22 DIAGNOSIS — C68.0 URETHRAL CANCER (H): ICD-10-CM

## 2019-11-22 DIAGNOSIS — Z01.818 PRE-OP EXAM: ICD-10-CM

## 2019-11-22 LAB
ABO + RH BLD: NORMAL
ABO + RH BLD: NORMAL
ALBUMIN SERPL-MCNC: 4 G/DL (ref 3.4–5)
ALP SERPL-CCNC: 96 U/L (ref 40–150)
ALT SERPL W P-5'-P-CCNC: 26 U/L (ref 0–70)
ANION GAP SERPL CALCULATED.3IONS-SCNC: 4 MMOL/L (ref 3–14)
AST SERPL W P-5'-P-CCNC: 9 U/L (ref 0–45)
BASOPHILS # BLD AUTO: 0.1 10E9/L (ref 0–0.2)
BASOPHILS NFR BLD AUTO: 0.6 %
BILIRUB SERPL-MCNC: 0.4 MG/DL (ref 0.2–1.3)
BLD GP AB SCN SERPL QL: NORMAL
BLOOD BANK CMNT PATIENT-IMP: NORMAL
BLOOD BANK CMNT PATIENT-IMP: NORMAL
BUN SERPL-MCNC: 17 MG/DL (ref 7–30)
CALCIUM SERPL-MCNC: 9.5 MG/DL (ref 8.5–10.1)
CHLORIDE SERPL-SCNC: 98 MMOL/L (ref 94–109)
CO2 SERPL-SCNC: 32 MMOL/L (ref 20–32)
CREAT SERPL-MCNC: 1.22 MG/DL (ref 0.66–1.25)
DIFFERENTIAL METHOD BLD: ABNORMAL
EOSINOPHIL # BLD AUTO: 0.3 10E9/L (ref 0–0.7)
EOSINOPHIL NFR BLD AUTO: 3.5 %
ERYTHROCYTE [DISTWIDTH] IN BLOOD BY AUTOMATED COUNT: 13.2 % (ref 10–15)
GFR SERPL CREATININE-BSD FRML MDRD: 64 ML/MIN/{1.73_M2}
GLUCOSE SERPL-MCNC: 122 MG/DL (ref 70–99)
HCT VFR BLD AUTO: 35.5 % (ref 40–53)
HGB BLD-MCNC: 11.6 G/DL (ref 13.3–17.7)
IMM GRANULOCYTES # BLD: 0 10E9/L (ref 0–0.4)
IMM GRANULOCYTES NFR BLD: 0.3 %
INR PPP: 1.06 (ref 0.86–1.14)
LYMPHOCYTES # BLD AUTO: 2.3 10E9/L (ref 0.8–5.3)
LYMPHOCYTES NFR BLD AUTO: 25.6 %
MCH RBC QN AUTO: 27 PG (ref 26.5–33)
MCHC RBC AUTO-ENTMCNC: 32.7 G/DL (ref 31.5–36.5)
MCV RBC AUTO: 83 FL (ref 78–100)
MONOCYTES # BLD AUTO: 0.6 10E9/L (ref 0–1.3)
MONOCYTES NFR BLD AUTO: 6.8 %
NEUTROPHILS # BLD AUTO: 5.7 10E9/L (ref 1.6–8.3)
NEUTROPHILS NFR BLD AUTO: 63.2 %
NRBC # BLD AUTO: 0 10*3/UL
NRBC BLD AUTO-RTO: 0 /100
PLATELET # BLD AUTO: 299 10E9/L (ref 150–450)
POTASSIUM SERPL-SCNC: 4.2 MMOL/L (ref 3.4–5.3)
PROT SERPL-MCNC: 8 G/DL (ref 6.8–8.8)
RBC # BLD AUTO: 4.3 10E12/L (ref 4.4–5.9)
SODIUM SERPL-SCNC: 134 MMOL/L (ref 133–144)
SPECIMEN EXP DATE BLD: NORMAL
WBC # BLD AUTO: 9 10E9/L (ref 4–11)

## 2019-11-22 RX ORDER — PSYLLIUM HUSK/CALCIUM CARB 1 G-60 MG
1 CAPSULE ORAL EVERY MORNING
COMMUNITY

## 2019-11-22 RX ORDER — ASCORBIC ACID 100 MG
1 TABLET,CHEWABLE ORAL EVERY MORNING
COMMUNITY

## 2019-11-22 RX ORDER — POLYETHYLENE GLYCOL 3350 17 G/17G
1 POWDER, FOR SOLUTION ORAL DAILY
COMMUNITY

## 2019-11-22 ASSESSMENT — MIFFLIN-ST. JEOR: SCORE: 1744.33

## 2019-11-22 ASSESSMENT — LIFESTYLE VARIABLES: TOBACCO_USE: 0

## 2019-11-22 NOTE — PROGRESS NOTES
WOC Preoperative Ostomy Consult    Referral from Dr. Caldwell  Consult attended by patient and spouse  Diagnosis:  Urethral cancer Date of Surgery: 11/27/2019   Type of Surgery: Radical Cystoprostatectomy, pelvic lymph node dissection, ileal conduit urinary diversion  Emotional readiness for surgery: no acute distress  Physical Limitations: Without limitations  Abdomen assessed for site by: Patient's ability to visiualize area, avoidance of skin creases and scars, palpating for rectus abdominus muscle and clothing fit  Teaching: Anatomy/picture of stoma, stoma/bowel function postop, intro to pouches, written/media offered and role of WOC/postop followup explained.  Stoma site marking:  Marking pen and tegaderm   Location chosen: [facility]  [employer]'s Ostomy packet given to patient  Dr Gould was available for supervision of care if needed or if questions should arise and regarding plan of care.  Maddison Saucedo, RN RN [facility]

## 2019-11-22 NOTE — H&P
Pre-Operative H & P     CC:  Preoperative exam to assess for increased cardiopulmonary risk while undergoing surgery and anesthesia.    Date of Encounter: 11/22/2019  Primary Care Physician:  No Ref-Primary, Physician  Reason for visit: Urethral cancer  HPI  Daniel Lorenzo is a 59 year old male who presents for pre-operative H & P in preparation for Radical Cystoprostatectomy, pelvic lymph node dissection, ileal conduit urinary diversion, PENECTOMY, TOTAL, URETHRECTOMY with Dr. Maddox on 11/27/19 at [facility]. History is obtained from the patient and medical record.     Patient who was recently referred to Dr. 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 He is s/p CYSTOSCOPY with urethral biopsy, urethral calibration, cystogram, complex catheter placement due to concern for urinary retention on 11/6/19. He has no definitive signs of metastatic disease; there are visible, but not enlarged lymph nodes in both inguinal canals. His imaging was reviewed and he was counseled for above procedure.     Patient's history is otherwise significant for HLD, HTN, ELKIN, DM II, CKD, chronic anemia, and anxiety/depression. After an abnormal EKG (sinus rhythm, occasional PACs, non-specific T wave changes--> sinus tachycardia and non-specific T wave changes), he was evaluated by Cardiology on 5/31/19. Due to absence of symptoms or heart failure there was no need to consider further cardiac testing. He was recommended to continue ASA, statin, and anti-hypertensives and to remain active. He was instructed to report symptoms of new dyspnea or chest pain and follow up was planned.     Patient reports today with indwelling catheter in place. He is very uncomfortable and is taking Hydrocodone every 6 hours.       Past Medical History  Past Medical History:   Diagnosis Date     Anemia in chronic kidney disease       Anxiety      Controlled type 2 diabetes mellitus with stage 3 chronic kidney disease, with long-term current use of insulin (H) 1/22/2018     Essential hypertension 5/3/2019     Hyperlipidemia 7/28/2015     ELKIN (obstructive sleep apnea) 1/1/2007     Stage 3 chronic kidney disease (H) 11/7/2017     Urethral cancer (H) 11/20/2019       Past Surgical History  Past Surgical History:   Procedure Laterality Date     CYSTOSCOPY, BIOPSY URETHRA  11/06/2019     HERNIA REPAIR Left      Knee arthroscopy Left        Hx of Blood transfusions/reactions: Denies.      Hx of abnormal bleeding or anti-platelet use: Denies.     Menstrual history: No LMP for male patient.    Steroid use in the last year: Denies.    Personal or FH with difficulty with Anesthesia:  Denies.     Prior to Admission Medications  Current Outpatient Medications   Medication Sig Dispense Refill     Ascorbic Acid (VITAMIN C) 100 MG CHEW Take 1 tablet by mouth every morning       atorvastatin (LIPITOR) 20 MG tablet Take 20 mg by mouth At Bedtime        Cholecalciferol (VITAMIN D) 50 MCG (2000 UT) CAPS Take 1 capsule by mouth every morning        citalopram (CELEXA) 20 MG tablet Take 20 mg by mouth every evening        HYDROcodone-Acetaminophen (VICODIN) 5-300 MG TABS Take 1 tablet by mouth every 6 hours as needed (pain) 30 tablet 0     insulin glargine (BASAGLAR KWIKPEN) 100 UNIT/ML pen Inject 14 Units Subcutaneous       losartan-hydrochlorothiazide (HYZAAR) 100-12.5 MG tablet Take 1 tablet by mouth every evening        metFORMIN (GLUCOPHAGE) 1000 MG tablet TAKE 1 TABLET BY MOUTH TWICE DAILY       polyethylene glycol (MIRALAX) powder Take 1 capful by mouth daily       Psyllium-Calcium (METAMUCIL PLUS CALCIUM) CAPS Take 1 capsule by mouth every morning       traZODone (DESYREL) 50 MG tablet Take 100 mg by mouth At Bedtime        aspirin (ASA) 81 MG chewable tablet Take 81 mg by mouth       cetirizine (ZYRTEC ALLERGY) 10 MG tablet Take 10 mg by mouth          Allergies  No Known Allergies    Social History  Social History     Socioeconomic History     Marital status:      Spouse name: Not on file     Number of children: Not on file     Years of education: Not on file     Highest education level: Not on file   Occupational History     Not on file   Social Needs     Financial resource strain: Not on file     Food insecurity:     Worry: Not on file     Inability: Not on file     Transportation needs:     Medical: Not on file     Non-medical: Not on file   Tobacco Use     Smoking status: Never Smoker     Smokeless tobacco: Never Used   Substance and Sexual Activity     Alcohol use: Not Currently     Drug use: Never     Sexual activity: Not on file   Lifestyle     Physical activity:     Days per week: Not on file     Minutes per session: Not on file     Stress: Not on file   Relationships     Social connections:     Talks on phone: Not on file     Gets together: Not on file     Attends Mandaeism service: Not on file     Active member of club or organization: Not on file     Attends meetings of clubs or organizations: Not on file     Relationship status: Not on file     Intimate partner violence:     Fear of current or ex partner: Not on file     Emotionally abused: Not on file     Physically abused: Not on file     Forced sexual activity: Not on file   Other Topics Concern     Not on file   Social History Narrative     Not on file       Family History  Family History   Problem Relation Age of Onset     Colon Cancer Father      Colon Cancer Sister      Pancreatic Cancer Sister        Review of Systems  ROS/MED HX  The complete review of systems is negative other than noted in the HPI or here.   ENT/Pulmonary:     (+)sleep apnea, , . .   (-) tobacco use   Neurologic:  - neg neurologic ROS     Cardiovascular:     (+) Dyslipidemia, hypertension----. Taking blood thinners Pt has received instructions: Instructions Given to patient: ASA 81 mg daily with planned hold  "for surgery. . . :. . Previous cardiac testing date:results:chris: results:ECG reviewed date:11/6/19 results:SR date: results:          METS/Exercise Tolerance:  >4 METS   Hematologic:  - neg hematologic  ROS       Musculoskeletal:   (+) arthritis,  -       GI/Hepatic:  - neg GI/hepatic ROS       Renal/Genitourinary:     (+) chronic renal disease, type: CRI, Pt does not require dialysis, Pt has no history of transplant,       Endo:     (+) type II DM Using insulin - not using insulin pump Diabetic complications: nephropathy, Obesity, .      Psychiatric:     (+) psychiatric history anxiety and depression      Infectious Disease:  - neg infectious disease ROS       Malignancy:   (+) Malignancy History of Other  Other CA urethral cancer Active status post         Other:    (+) No chance of pregnancy C-spine cleared: N/A, no H/O Chronic Pain,no other significant disability            PHYSICAL EXAM:   Mental Status/Neuro: A/A/O; Age Appropriate   Airway: Facies: Feasible  Mallampati: I  Mouth/Opening: Full  TM distance: > 6 cm  Neck ROM: Full   Respiratory: Auscultation: CTAB     Resp. Rate: Normal     Resp. Effort: Normal      CV: Rhythm: Regular  Heart: Normal Sounds   Comments:        Preop Vitals    BP Readings from Last 3 Encounters:   11/18/19 127/87   10/29/19 130/86    Pulse Readings from Last 3 Encounters:   11/18/19 92   10/29/19 86      Resp Readings from Last 3 Encounters:   No data found for Resp    SpO2 Readings from Last 3 Encounters:   10/29/19 97%      Temp Readings from Last 1 Encounters:   No data found for Temp    Ht Readings from Last 1 Encounters:   11/18/19 1.753 m (5' 9\")      Wt Readings from Last 1 Encounters:   11/18/19 93 kg (205 lb)    Estimated body mass index is 30.27 kg/m  as calculated from the following:    Height as of 11/18/19: 1.753 m (5' 9\").    Weight as of 11/18/19: 93 kg (205 lb).       Temp: 97.5  F (36.4  C) Temp src: Oral BP: 131/82 Pulse: 88   Resp: 16 SpO2: 98 %         207 lbs " "0 oz  5' 9\"   Body mass index is 30.57 kg/m .       Physical Exam  Constitutional: Awake, alert, cooperative, no apparent distress, and appears stated age. Accompanied by wife.   Eyes: Pupils equal, round and reactive to light, extra ocular muscles intact, sclera clear, conjunctiva normal. Glasses on.  HENT: Normocephalic, oral pharynx with moist mucus membranes, good dentition. No goiter appreciated.   Respiratory: Clear to auscultation bilaterally, no crackles or wheezing. No cough or obvious dyspnea.  Cardiovascular: Regular rate and rhythm, normal S1 and S2, and no murmur noted.  Carotids +2, no bruits. No edema. Palpable pulses to radial  DP and PT arteries.   GI: Normal bowel sounds, soft, non-distended, non-tender, no masses palpated. Difficult exam due to obese abdomen.  Lymph/Hematologic: No cervical lymphadenopathy and no supraclavicular lymphadenopathy.  Genitourinary:  Indwelling catheter to leg bag.  Skin: Warm and dry.  No rashes at anticipated surgical site.   Musculoskeletal: Full ROM of neck. There is no redness, warmth, or swelling of the joints. Gross motor strength is normal.    Neurologic: Awake, alert, oriented to name, place and time. Cranial nerves II-XII are grossly intact. Gait is irregular due to catheter.    Neuropsychiatric: Calm, cooperative. Normal affect.     Labs: (personally reviewed)   Lab Results   Component Value Date    WBC 9.0 11/22/2019     Lab Results   Component Value Date    RBC 4.30 11/22/2019     Lab Results   Component Value Date    HGB 11.6 11/22/2019     Lab Results   Component Value Date    HCT 35.5 11/22/2019     Lab Results   Component Value Date    MCV 83 11/22/2019     Lab Results   Component Value Date    MCH 27.0 11/22/2019     Lab Results   Component Value Date    MCHC 32.7 11/22/2019     Lab Results   Component Value Date    RDW 13.2 11/22/2019     Lab Results   Component Value Date     11/22/2019     Last Comprehensive Metabolic Panel:  Sodium   Date " Value Ref Range Status   11/22/2019 134 133 - 144 mmol/L Final     Potassium   Date Value Ref Range Status   11/22/2019 4.2 3.4 - 5.3 mmol/L Final     Chloride   Date Value Ref Range Status   11/22/2019 98 94 - 109 mmol/L Final     Carbon Dioxide   Date Value Ref Range Status   11/22/2019 32 20 - 32 mmol/L Final     Anion Gap   Date Value Ref Range Status   11/22/2019 4 3 - 14 mmol/L Final     Glucose   Date Value Ref Range Status   11/22/2019 122 (H) 70 - 99 mg/dL Final     Urea Nitrogen   Date Value Ref Range Status   11/22/2019 17 7 - 30 mg/dL Final     Creatinine   Date Value Ref Range Status   11/22/2019 1.22 0.66 - 1.25 mg/dL Final     GFR Estimate   Date Value Ref Range Status   11/22/2019 64 >60 mL/min/[1.73_m2] Final     Comment:     Non  GFR Calc  Starting 12/18/2018, serum creatinine based estimated GFR (eGFR) will be   calculated using the Chronic Kidney Disease Epidemiology Collaboration   (CKD-EPI) equation.       Calcium   Date Value Ref Range Status   11/22/2019 9.5 8.5 - 10.1 mg/dL Final     Lab Results   Component Value Date    AST 9 11/22/2019     Lab Results   Component Value Date    ALT 26 11/22/2019     No results found for: BILICONJ   Lab Results   Component Value Date    BILITOTAL 0.4 11/22/2019     Lab Results   Component Value Date    ALBUMIN 4.0 11/22/2019     Lab Results   Component Value Date    PROTTOTAL 8.0 11/22/2019      Lab Results   Component Value Date    ALKPHOS 96 11/22/2019     INR 1.06    EKG: Personally reviewed 11/6/19 Sinus rhythm    MR Pelvis 11/13/19  IMPRESSION:    1. Irregular lobulated bulbar urethral mass extending into the corpora spongiosum and bilateral corpus cavernosa. Possible involvement of the bulbospongiosus muscles.  2. Bladder wall thickening may be secondary to outlet obstruction.  3. Bilateral nonenlarged inguinal lymph nodes are indeterminate.  4. Small left fat-containing inguinal hernia.    CT Chest 11/12/19  IMPRESSION: Tiny  nonspecific pulmonary nodules as noted above. These are most likely incidental but, technically, are indeterminate.  Recommend follow-up noncontrast CT of the chest in 3 months. Examination otherwise unremarkable.    CT abd/pelvis 11/5/19  IMPRESSION:      1. No lymph node enlargement in the abdomen or pelvis or other findings suspicious for metastatic disease.  2. Poorly defined low-attenuation area measuring 2.0 x 1.7 x 1.7 cm in the penis proximally. Question if this represents the known urethral mass itself and/or dilation of the urethra due to to a mass or stricture.  3. The prostate gland is mildly enlarged.  4. Mild thickened appearance of the urinary bladder is nonspecific, may be accentuated by limited filling.  5. Hepatic and renal cysts.  6. 0.3 cm left lower lobe pulmonary nodule. Follow-up according the overall clinical picture in a patient with a possible malignancy.    US Carotid 2/15/19  IMPRESSION:    Velocities and ratios in bilateral proximal internal carotid arteries meet criteria for less than 50% stenosis.    Imaging reviewed by this provider     Outside records reviewed from: Care Everywhere    ASSESSMENT and PLAN  Daniel Lorenzo is a 59 year old male scheduled to undergo Radical Cystoprostatectomy, pelvic lymph node dissection, ileal conduit urinary diversion, PENECTOMY, TOTAL, URETHRECTOMY with Dr. Maddox on 11/27/19. He has the following specific operative considerations:   - RCRI : 6.6% risk of major adverse cardiac event.   - Anesthesia considerations:  Refer to PAC assessment in anesthesia records  - VTE risk: 3%  - Risk of PONV score = 2.  If > 2, anti-emetic intervention recommended.    --Urethral cancer with above procedure planned. Has indwelling catheter that is causing pain. Bag and tubing change by staff today and UC obtained. Will take Hydrocodone on DOS.  --HLD. atorvastatin. HTN. losartan-hydrochlorothiazide at HS. No other cardiac history or symptoms. Cards evaluation  above. Testing above. Good activity tolerance.   --Nonsmoker. Denies pulmonary symptoms. ELKIN but has never used CPAP.  --Chronic anemia Hgb 11.6. Denies history of blood transfusion.  --DIABETES MELLITUS II. Last A1C 6.7. Will hold metformin and take 80% of Basaglar evening before surgery.   --CKD Cr 1.22  --Anxiety/depression Celexa at HS.  --Pain management. Discussed possibility of nerve block if appropriate for patient's procedure. Final counseling and decisions by regional team on DOS.  --Type and screen drawn today.  --Enhanced recovery pathway initiated.    Arrival time, NPO, shower and medication instructions provided by nursing staff today. Preparing For Your Surgery handout given.    Patient was discussed with Dr Bowie.    LEIA Asif CNS  Preoperative Assessment Center  [facility]  [facility]  Phone: [phone]  Fax: [phone]

## 2019-11-22 NOTE — ANESTHESIA PREPROCEDURE EVALUATION
Anesthesia Pre-Procedure Evaluation    Patient: Daniel Lorenzo   MRN:     [id number] Gender:   male   Age:    59 year old :      1960        Preoperative Diagnosis: Urethral cancer (H) [C68.0]   Procedure(s):  Radical Cystoprostatectomy, pelvic lymph node dissection, ileal conduit urinary diversion  PENECTOMY, TOTAL  URETHRECTOMY     Past Medical History:   Diagnosis Date     Anemia in chronic kidney disease      Anxiety      Controlled type 2 diabetes mellitus with stage 3 chronic kidney disease, with long-term current use of insulin (H) 2018     Essential hypertension 5/3/2019     Hyperlipidemia 2015     ELKIN (obstructive sleep apnea) 2007     Stage 3 chronic kidney disease (H) 2017     Urethral cancer (H) 2019    Added automatically from request for surgery [id number]      Past Surgical History:   Procedure Laterality Date     CYSTOSCOPY, BIOPSY URETHRA  2019     HERNIA REPAIR Left      Knee arthroscopy Left           Anesthesia Evaluation     . Pt has had prior anesthetic. Type: General    No history of anesthetic complications          ROS/MED HX    ENT/Pulmonary:     (+)sleep apnea, doesn't use CPAP , . .   (-) tobacco use   Neurologic:  - neg neurologic ROS     Cardiovascular:     (+) Dyslipidemia, hypertension----. Taking blood thinners Pt has received instructions: Instructions Given to patient: on hold for past two weeks. . . :. . Previous cardiac testing date:results:date: results:ECG reviewed date:19 results:SR date: results:          METS/Exercise Tolerance:  >4 METS   Hematologic:     (+) Anemia, -     (-) History of Transfusion   Musculoskeletal:   (+) arthritis,  -       GI/Hepatic:  - neg GI/hepatic ROS       Renal/Genitourinary:     (+) chronic renal disease, type: CRI, Pt does not require dialysis, Pt has no history of transplant, Other Renal/ Genitourinary, in dwelling catheter      Endo:     (+) type II DM Last HgA1c: 6.7 date: 10/8/19 Using insulin - not  "using insulin pump Normal glucose range:  not previously admitted for DM/DKA Diabetic complications: nephropathy, Obesity, .      Psychiatric:     (+) psychiatric history anxiety and depression      Infectious Disease:  - neg infectious disease ROS       Malignancy:   (+) Malignancy History of Other  Other CA urethral cancer Active status post         Other:    (+) No chance of pregnancy C-spine cleared: N/A, no H/O Chronic Pain,no other significant disability                        PHYSICAL EXAM:   Mental Status/Neuro: A/A/O; Age Appropriate   Airway: Facies: Feasible  Mallampati: I  Mouth/Opening: Full  TM distance: > 6 cm  Neck ROM: Full   Respiratory: Auscultation: CTAB     Resp. Rate: Normal     Resp. Effort: Normal      CV: Rhythm: Regular  Heart: Normal Sounds  Edema: None   Comments:      Dental: Normal Dentition                LABS:  CBC: No results found for: WBC, HGB, HCT, PLT  BMP: No results found for: NA, POTASSIUM, CHLORIDE, CO2, BUN, CR, GLC  COAGS: No results found for: PTT, INR, FIBR  POC: No results found for: BGM, HCG, HCGS  OTHER: No results found for: PH, LACT, A1C, VALENTINA, PHOS, MAG, ALBUMIN, PROTTOTAL, ALT, AST, GGT, ALKPHOS, BILITOTAL, BILIDIRECT, LIPASE, AMYLASE, SHREYAS, TSH, T4, T3, CRP, SED     Preop Vitals    BP Readings from Last 3 Encounters:   11/18/19 127/87   10/29/19 130/86    Pulse Readings from Last 3 Encounters:   11/18/19 92   10/29/19 86      Resp Readings from Last 3 Encounters:   No data found for Resp    SpO2 Readings from Last 3 Encounters:   10/29/19 97%      Temp Readings from Last 1 Encounters:   No data found for Temp    Ht Readings from Last 1 Encounters:   11/18/19 1.753 m (5' 9\")      Wt Readings from Last 1 Encounters:   11/18/19 93 kg (205 lb)    Estimated body mass index is 30.27 kg/m  as calculated from the following:    Height as of 11/18/19: 1.753 m (5' 9\").    Weight as of 11/18/19: 93 kg (205 lb).     LDA:        Assessment:   ASA SCORE: 3            Plan: "   Anes. Type:  General; Peripheral Nerve Block   Pre-Medication: Acetaminophen   Induction:  IV (Standard)   Airway: ETT; Oral   Access/Monitoring: PIV; 2nd PIV; A-Line   Maintenance: Balanced     Advanced Monitoring: BIS     Postop Plan:   Postop Pain: Opioids; Regional; Long Acting Opioids  Postop Sedation/Airway: Not planned  Disposition: Inpatient/Admit     PONV Management:   Adult Risk Factors:, Postop Opioids   Prevention: Ondansetron, Dexamethasone                PAC Discussion and Assessment    ASA Classification: 3  Case is suitable for: [place]  Anesthetic techniques and relevant risks discussed: GA and GA with regional block for post-op pain control  Invasive monitoring and risk discussed: No  Types:   Possibility and Risk of blood transfusion discussed: Yes  NPO instructions given:   Additional anesthetic preparation and risks discussed:   Needs early admission to pre-op area:   Other:     PAC Resident/NP Anesthesia Assessment:  Daniel Lorenzo is a 59 year old male scheduled to undergo Radical Cystoprostatectomy, pelvic lymph node dissection, ileal conduit urinary diversion, PENECTOMY, TOTAL, URETHRECTOMY with Dr. Maddox on 11/27/19. He has the following specific operative considerations:   - RCRI : 6.6% risk of major adverse cardiac event.   - VTE risk: 3%  - Risk of PONV score = 2.  If > 2, anti-emetic intervention recommended.    --Urethral cancer with above procedure planned. Has indwelling catheter that is causing pain. Bag and tubing change by staff today and UC obtained. Will take Hydrocodone on DOS.  --HLD. atorvastatin. HTN. losartan-hydrochlorothiazide at HS. No other cardiac history or symptoms. Cards evaluation above. Testing above. Good activity tolerance.   --Nonsmoker. Denies pulmonary symptoms. ELKIN but has never used CPAP.  --Chronic anemia Hgb 11.6. Denies history of blood transfusion.  --DIABETES MELLITUS II. Last A1C 6.7. Will hold metformin and take 80% of Basaglar evening before  surgery.   --CKD Cr 1.22  --Anxiety/depression Celexa at HS.  --Pain management. Discussed possibility of nerve block if appropriate for patient's procedure. Final counseling and decisions by regional team on DOS.  --Type and screen drawn today.  --Enhanced recovery pathway initiated.    Patient was discussed with Dr Bowie.        Reviewed and Signed by PAC Mid-Level Provider/Resident  Mid-Level Provider/Resident: LEIA Louis, CNS  Date: 11/22/19  Time: 12:37pm    Attending Anesthesiologist Anesthesia Assessment:        Anesthesiologist:   Date:   Time:   Pass/Fail:   Disposition:     PAC Pharmacist Assessment:        Pharmacist:   Date:   Time:    LEIA Asif

## 2019-11-22 NOTE — PATIENT INSTRUCTIONS
Preparing for Your Surgery      Name:  Daniel Lorenzo   MRN:  [id number]   :  1960   Today's Date:  2019     Arriving for surgery:  Surgery date:  19  Arrival time:  5:45AM  Please come to:       [facility] [address]  [address] [place]  [address]    - ? parking is available in front of the hospital      -    Please proceed to [address] on the 3rd floor. [phone]?     - ?If you are in need of directions, wheelchair or escort please stop at the Information Desk in the lobby.  Inform the information person that you are here for surgery; a wheelchair and escort to [address] will be provided.?     What can I eat or drink?  -  You may have solid food or milk products until 19 Midnight; Begin Clear liquid diet 19.   -  You may have Gatorade, Ensure Clear, water, apple juice or 7up/Sprite until 2 hours prior to your surgery.    Which medicines can I take?  Stop Aspirin, Multivitamins and Vitamin C one week prior to surgery.  Hold Ibuprofen for 24 hours and/or Naproxen for 48 hours prior to surgery.   -  Do NOT take these medications in the morning, the day of surgery:    Vitamin D  Metformin(Glucophage)    Miralax   Metamucil        -  Please take these medications the day of surgery:  Please take 11 units of Basaglar insulin the evening prior to surgery.     Cetirizine(Zyrtec)  Hydrocodone-Acetaminophen(Vicodin)    How do I prepare myself?  -  Take two showers: one the night before surgery; and one the morning of surgery.         Use Scrubcare or Hibiclens to wash from neck down, leave soap on your skin for up to one minute.  Do not get soap in your eyes or ears.  You may use your own shampoo and conditioner; no other hair products.   -  Do NOT use lotion, powder, deodorant, or antiperspirant the day of your surgery.  -  Do NOT wear jewelry.  -  Begin using Incentive Spirometer 1 week prior to surgery.  Use 4 times per day, up    to 5  breaths each time.  Bring Incentive Spirometer to hospital.  - Do not bring your own medications to the hospital, except for inhalers and eye   drops.  -  Bring your ID and insurance card.    Questions or Concerns:  -If you are scheduled on the [facility] or [facility] and have questions or concerns regarding the day of surgery, please call Preadmission Nursing at [phone].     -For questions after surgery please call your surgeons office.       Enhanced Recovery After Surgery     This is a team effort, including you, to get you back on your feet, eating and drinking normally and out of the hospital as quickly as possible.  The goals are: 1) NO INFECTIONS and   2) RETURN TO NORMAL DIET    How can we achieve these goals?  1) STAY ACTIVE: Walk every day before your surgery; try to increase the amount every day.  Walk after surgery as much as you can-the nurses will help you.  Walking speeds healing and gets you home quicker, you heal better at home and have less risk of infection.     2) INCENTIVE SPIROMETER: Practice your incentive spirometer 4 times per day with 5 repetitions each time.  Using the incentive spirometer can strengthen your muscles between your ribs and help you have a strong cough after surgery.  A more effective cough can help prevent problems with your lungs.    3) STAY HYDRATED: Drink clear liquids up until 2 hours before your surgery. We would like you to purchase a drink such as Gatorade or Ensure Clear (not the milkshake type).  Drink this before bedtime and on the way into the hospital, drink between 8-10 ounces or until you feel hydrated.  Keeping well hydrated leads to your veins being plump, you wake up faster, and you are less likely to be nauseated. Start drinking water as soon as you can after surgery and advance to clear liquids and food as tolerated.  IV fluids contain salt, drinking fluids will minimize the amount of IV fluids you need and decrease the amount of salt you get.    The  most common reason for the patient to be readmitted is dehydration. Staying hydrated after you go home from the hospital is very important.  Ensure or Ensure Clear are good options to keep you hydrated.     4) PAIN MANAGEMENT: If we minimize the amount of opioids and narcotics, and use regional blocks (which numb the area where your surgery is) along with oral pain medications; you will have less side effects of nausea and constipation. Narcotics can slow down your bowels and cause you to stay in the hospital longer.     Our goal is to keep you comfortable; eating and drinking normally and back home safely.     Using an Incentive Spirometer    An incentive spirometer is a device that helps you do deep breathing exercises. These exercises expand your lungs, aid in circulation, and help prevent pneumonia. Deep breathing exercises also help you breathe better and improve the function of your lungs by:    Keeping your lungs clear    Strengthening your breathing muscles    Helping prevent respiratory complications or problems  The incentive spirometer gives you a way to take an active part in your care. A nurse or therapist will teach you breathing exercises. To do these exercises, you will breathe in through your mouth and not your nose. The incentive spirometer only works correctly if you breathe in through your mouth.    Steps to clear lungs  Step 1. Exhale normally. Then, inhale normally.    Relax and breathe out.  Step 2. Place your lips tightly around the mouthpiece.    Make sure the device is upright and not tilted.  Step 3. Inhale as much air as you can through the mouthpiece (don't breath through your nose).    Inhale slowly and deeply.    Hold your breath long enough to keep the balls or disk raised for at least 3 to 5 seconds, or as instructed by your healthcare provider.  Step 4. Repeat the exercise regularly.  Begin using the Incentive Spirometer one week prior to your surgery, 4 times per day-5 times  each.      Patient Education     Emptying and Cleaning Your Urinary Catheter Bag  You have an indwelling urinary catheter. This drains urine from your bladder into a bag. The bag can be one that is used at your bedside. Or it can be a smaller bag that is strapped to your leg. Follow the steps below to empty and clean a urinary bag.       Drain Clean tube Clean catheter   Step 1. Drain the bag    Wash your hands well with soap and water to prevent infecting the urinary catheter and bag.    If the short drainage tube is inserted into a pocket on the bag, take the drainage tube out of the pocket.    Hold the drainage tube over a toilet or measuring container. Open the valve.    Don t touch the tip of the valve or let it touch the toilet or container.    Wash your hands again.  Step 2. Clean the drainage tube    When the bag is empty, clean the tip of the drainage valve with an alcohol wipe.    Close the valve.    Reinsert the drainage tube into the pocket, if there is one.  Step 3. Clean your skin    Wash your hands well before and after cleaning your skin.    If you have a catheter (such as a Chiu) that enters through the urethra, clean the urethral area with soap and water 1 time(s) daily as you were taught by your healthcare provider. You should also clean after every bowel movement to prevent infection.  ? Don't pull on the tubing when cleaning so you don t injure the urethra.  ? Don t apply antibiotic ointment or any other antibacterial product to the urethra.  ? Don t use lubricant on the urethra.  ? Don t apply powder to the genital area or to the tubing.    If you have a suprapubic catheter, your healthcare provider will tell you how to clean your skin around the catheter. This is a catheter that was surgically placed into the bladder through the lower abdomen.  Step 4. Check and clean the catheter tubing    Check the tubing. If there are kinks, cracks, clogs, or you can t see into the tubing, you ll need to  change to new tubing as you were shown by your healthcare provider.    If the current tubing can still be used, wash it with soap and water. Always wash the tubing in the direction away from your body. Don't pull on the tubing.    Dry the tubing with a clean washcloth or paper towel.  Step 5. Clean the drainage bag    Have a clean backup bag or other drainage device ready.    Follow these steps:  ? Wash your hands well with soap and water.  ? Disconnect the bag from the catheter tubing. Connect the tubing to the backup bag or drainage device.  ? Drain any remaining urine from the bag you just disconnected. Close the drainage valve.  ? Pour some warm (not hot) soapy water into the bag. Swish the soap around, being sure to get the corners of the bag.  ? Open the drainage valve to drain the soap. Close the valve.:  ? Use a certain solution to clean the bag if your healthcare provider recommends one. Recommended solutions may include:     2 parts vinegar and 3 parts water    1 tablespoon of chlorine bleach mixed with a half cup of water  ? Ask your healthcare provider how often you should clean your bag and what solution you should use to reduce odor and keep your bag free of germs.  ? Shake the solution a bit and allow it to remain in the bag for 30 minutes.  ? Drain the solution and rinse the bag with cold tap water.  ? Hang the bag to drain and air-dry.  When to call your healthcare provider  Call your healthcare provider right away if you have any of the following:    Little or no urine flowing into the bag    Urine leaking where the catheter enters the body    Pain, burning, or redness of the area where the catheter enters the body    Bloody urine (a trace of blood is normal)    Cloudy or foul-smelling urine, or sand-like grains in your urine    Pain in your lower back or lower abdomen    Your catheter falls out    Fever of 100.4 F (38 C) or higher, or as directed by your healthcare provider    Shaking chills    Date Last Reviewed: 1/1/2017 2000-2018 [employer]. [address] [place] [address], [place] [address]. All rights reserved. This information is not intended as a substitute for professional medical care. Always follow your healthcare professional's instructions.    Patient Education     Discharge Instructions: Caring for Your Leg Bag  You are going home with a urinary catheter and collection device (drainage bag) in place. One type of collection device is called a leg bag. This is a smaller drainage bag that you can wear on your leg to collect urine during the day. The bag can fit under your clothing. You can move around with greater ease when using a leg bag instead of a larger collection bag.  You were shown how to care for your catheter in the hospital. This sheet will help you remember those steps when you are at home.  Home care    Wash your hands thoroughly before and after you care for your catheter or collection device.    Gather your supplies:  ? Alcohol wipes  ? Soap and water  ? Towel and washcloth  ? Leg strap and leg bag    Use soap and water to wash the area where your catheter enters your body. Rinse well.    Secure the bag uriostegui to your leg:  ? Put the leg band high on your thigh with the product label pointing away from your leg.  ? Stretch the leg band in place and fasten.  ? Place the catheter tubing over the bag and secure it. You may secure it with a Velcro tab or other method, depending on the product you use. Be sure to leave enough loop in the catheter above the leg band so you won't pull on the tube.  ? Every 4 to 6 hours, reposition the band. This will prevent pressure from the elastic on your leg. You can do this by changing the bag to the other leg or by raising or lowering the leg band.  ? Wash the band as often as needed. You can hand wash and dry the leg band.    Place the bag in the bag uriostegui.    Clean the urine bag end of the catheter and your catheter port with an  alcohol wipe.    Place a towel under the bag and port to keep urine from dripping onto your leg.    Before connecting the outlet valve at the bottom of the bag to the catheter, make sure that it is firmly closed. Flip the valve upward toward the bag. It needs to snap firmly in place. Be sure not to tug on the tubing. Be gentle.    Attach the urine bag to the end of the catheter. Insert the connector snugly into the catheter port. You can prevent dribbling urine by bending the catheter tubing just below the tip and holding it while you disconnect it from the catheter. Be careful to keep the tip clean while connecting the leg bag tubing to the catheter--this keeps germs from getting into the system.    Drain the bag when it's full. To drain the bag, flip the clamp downward. Direct the flexible outlet tube to control the flow of urine. You don t have to disconnect the leg bag from the catheter to empty it. Raise your leg up to the edge of the toilet to reach the leg bag. Then you can empty the bag directly into the toilet. This way, you won t need to bend over, which may be uncomfortable.    Keep the leg bag clean. Your healthcare provider may recommend that you use a specific solution to clean the bag. Recommended solutions may include:  ? 2 parts vinegar and 3 parts water  ? 1 tablespoon of chlorine bleach mixed with a half cup of water    Ask your healthcare provider how often you should clean your bag and what solution you should use ?to reduce odor and keep the bag free of germs.    Shake the solution a bit and allow it to remain in the bag for 30 minutes.      Drain the solution and rinse the bag with cold tap water.    Hang the bag to drain and air dry.    Remember to keep the drainage bag below the level of your bladder for proper drainage.  Follow-up  Make a follow-up appointment as directed by your healthcare provider.  When to call your healthcare provider  Call your healthcare provider right away if you  have any of the following:    Redness, swelling, or warmth around the catheter entry site    Pus draining from your catheter entry site or into the catheter tubing and bag    Blood, clots, or floating debris in the urine    Nausea and vomiting    Shaking chills    Fever above 100.4 F (38 C), or as directed by your healthcare provider    Pain that is not relieved by medicine     Catheter that falls out or is dislodged   Date Last Reviewed: 1/1/2017 2000-2018 [employer] [address] [place] [address]. All rights reserved. This information is not intended as a substitute for professional medical care. Always follow your healthcare professional's instructions.

## 2019-11-23 LAB
BACTERIA SPEC CULT: NORMAL
Lab: NORMAL
SPECIMEN SOURCE: NORMAL

## 2019-11-25 DIAGNOSIS — C68.0 URETHRAL CANCER (H): ICD-10-CM

## 2019-11-25 RX ORDER — HYDROCODONE BITARTRATE AND ACETAMINOPHEN 5; 300 MG/1; MG/1
1 TABLET ORAL EVERY 6 HOURS PRN
Qty: 30 TABLET | Refills: 0 | Status: CANCELLED | OUTPATIENT
Start: 2019-11-25

## 2019-11-25 NOTE — TELEPHONE ENCOUNTER
Ashok Vicente,      Patient is scheduled for surgery on 11/27/2019. He is requesting more pain medication.      Serina Parra MA

## 2019-11-25 NOTE — TELEPHONE ENCOUNTER
[facility] Call Center    Phone Message    May a detailed message be left on voicemail: yes    Reason for Call: Medication Refill Request    Has the patient contacted the pharmacy for the refill? Yes   Name of medication being requested: HYDROcodone-Acetaminophen (VICODIN) 5-300 MG TABS  Provider who prescribed the medication: Dr. Watkins Weight  Pharmacy: [id number] - [facility], [place] - [address] AT [facility] [address]  Date medication is needed: asap         Action Taken: Other: med refill

## 2019-11-25 NOTE — TELEPHONE ENCOUNTER
HYDROcodone-Acetaminophen (VICODIN) 5-300 MG TABS      Last Written Prescription Date:  11-18-19  Last Fill Quantity: 30,   # refills: 0  Last Office Visit : 11-18-19  Future Office visit:  12-19-19    Routing refill request to provider for review/approval because:  Controlled medication refill request.      Kathleen M Doege RN

## 2019-11-26 ENCOUNTER — PATIENT OUTREACH (OUTPATIENT)
Dept: UROLOGY | Facility: CLINIC | Age: 59
End: 2019-11-26

## 2019-11-26 DIAGNOSIS — C68.0 URETHRAL CANCER (H): Primary | ICD-10-CM

## 2019-11-26 RX ORDER — HYDROMORPHONE HYDROCHLORIDE 1 MG/ML
.3-.5 INJECTION, SOLUTION INTRAMUSCULAR; INTRAVENOUS; SUBCUTANEOUS EVERY 5 MIN PRN
Status: CANCELLED | OUTPATIENT
Start: 2019-11-26

## 2019-11-26 RX ORDER — FENTANYL CITRATE 50 UG/ML
25-50 INJECTION, SOLUTION INTRAMUSCULAR; INTRAVENOUS EVERY 5 MIN PRN
Status: CANCELLED | OUTPATIENT
Start: 2019-11-26

## 2019-11-26 RX ORDER — SODIUM CHLORIDE, SODIUM LACTATE, POTASSIUM CHLORIDE, CALCIUM CHLORIDE 600; 310; 30; 20 MG/100ML; MG/100ML; MG/100ML; MG/100ML
INJECTION, SOLUTION INTRAVENOUS CONTINUOUS
Status: CANCELLED | OUTPATIENT
Start: 2019-11-26

## 2019-11-26 RX ORDER — FLUMAZENIL 0.1 MG/ML
0.2 INJECTION, SOLUTION INTRAVENOUS
Status: CANCELLED | OUTPATIENT
Start: 2019-11-26

## 2019-11-26 RX ORDER — HYDROCODONE BITARTRATE AND ACETAMINOPHEN 5; 325 MG/1; MG/1
1 TABLET ORAL EVERY 6 HOURS PRN
Qty: 10 TABLET | Refills: 0 | Status: SHIPPED | OUTPATIENT
Start: 2019-11-26

## 2019-11-26 RX ORDER — ONDANSETRON 4 MG/1
4 TABLET, ORALLY DISINTEGRATING ORAL EVERY 30 MIN PRN
Status: CANCELLED | OUTPATIENT
Start: 2019-11-26

## 2019-11-26 RX ORDER — FENTANYL CITRATE 50 UG/ML
25-50 INJECTION, SOLUTION INTRAMUSCULAR; INTRAVENOUS
Status: CANCELLED | OUTPATIENT
Start: 2019-11-26

## 2019-11-26 RX ORDER — NALOXONE HYDROCHLORIDE 0.4 MG/ML
.1-.4 INJECTION, SOLUTION INTRAMUSCULAR; INTRAVENOUS; SUBCUTANEOUS
Status: CANCELLED | OUTPATIENT
Start: 2019-11-26 | End: 2019-11-27

## 2019-11-26 RX ORDER — ONDANSETRON 2 MG/ML
4 INJECTION INTRAMUSCULAR; INTRAVENOUS EVERY 30 MIN PRN
Status: CANCELLED | OUTPATIENT
Start: 2019-11-26

## 2019-11-26 RX ORDER — NALOXONE HYDROCHLORIDE 0.4 MG/ML
.1-.4 INJECTION, SOLUTION INTRAMUSCULAR; INTRAVENOUS; SUBCUTANEOUS
Status: CANCELLED | OUTPATIENT
Start: 2019-11-26

## 2019-11-26 RX ORDER — ACETAMINOPHEN 325 MG/1
975 TABLET ORAL ONCE
Status: CANCELLED | OUTPATIENT
Start: 2019-11-26 | End: 2019-11-26

## 2019-11-26 NOTE — PROGRESS NOTES
Patient called in requesting refill on his pain medication, Vicodin. He is scheduled for surgery tomorrow, 11/27/19.  Spoke with Dr. Caldwell who gave OK for #10 more tablets. Dr. Olea signed script.    Called and left message for patient to call back to come in and  script.

## 2019-11-27 ENCOUNTER — ANESTHESIA (OUTPATIENT)
Dept: SURGERY | Facility: CLINIC | Age: 59
End: 2019-11-27

## 2019-12-19 ENCOUNTER — PRE VISIT (OUTPATIENT)
Dept: UROLOGY | Facility: CLINIC | Age: 59
End: 2019-12-19